# Patient Record
Sex: MALE | Race: OTHER | NOT HISPANIC OR LATINO
[De-identification: names, ages, dates, MRNs, and addresses within clinical notes are randomized per-mention and may not be internally consistent; named-entity substitution may affect disease eponyms.]

---

## 2019-05-13 PROBLEM — Z00.00 ENCOUNTER FOR PREVENTIVE HEALTH EXAMINATION: Status: ACTIVE | Noted: 2019-05-13

## 2019-06-24 ENCOUNTER — APPOINTMENT (OUTPATIENT)
Dept: HEART AND VASCULAR | Facility: CLINIC | Age: 69
End: 2019-06-24

## 2022-04-25 ENCOUNTER — APPOINTMENT (OUTPATIENT)
Dept: HEART AND VASCULAR | Facility: CLINIC | Age: 72
End: 2022-04-25
Payer: COMMERCIAL

## 2022-04-25 ENCOUNTER — APPOINTMENT (OUTPATIENT)
Dept: HEART AND VASCULAR | Facility: CLINIC | Age: 72
End: 2022-04-25

## 2022-04-25 VITALS
SYSTOLIC BLOOD PRESSURE: 160 MMHG | DIASTOLIC BLOOD PRESSURE: 90 MMHG | HEIGHT: 67 IN | HEART RATE: 72 BPM | WEIGHT: 181.01 LBS | RESPIRATION RATE: 12 BRPM | BODY MASS INDEX: 28.41 KG/M2 | TEMPERATURE: 98.4 F | OXYGEN SATURATION: 98 %

## 2022-04-25 DIAGNOSIS — I11.9 HYPERTENSIVE HEART DISEASE W/OUT HEART FAILURE: ICD-10-CM

## 2022-04-25 DIAGNOSIS — R07.89 OTHER CHEST PAIN: ICD-10-CM

## 2022-04-25 DIAGNOSIS — I34.0 NONRHEUMATIC MITRAL (VALVE) INSUFFICIENCY: ICD-10-CM

## 2022-04-25 DIAGNOSIS — R06.00 DYSPNEA, UNSPECIFIED: ICD-10-CM

## 2022-04-25 DIAGNOSIS — R94.31 ABNORMAL ELECTROCARDIOGRAM [ECG] [EKG]: ICD-10-CM

## 2022-04-25 DIAGNOSIS — Z87.891 PERSONAL HISTORY OF NICOTINE DEPENDENCE: ICD-10-CM

## 2022-04-25 DIAGNOSIS — Z82.49 FAMILY HISTORY OF ISCHEMIC HEART DISEASE AND OTHER DISEASES OF THE CIRCULATORY SYSTEM: ICD-10-CM

## 2022-04-25 PROCEDURE — 36415 COLL VENOUS BLD VENIPUNCTURE: CPT

## 2022-04-25 PROCEDURE — 93000 ELECTROCARDIOGRAM COMPLETE: CPT

## 2022-04-25 PROCEDURE — 99243 OFF/OP CNSLTJ NEW/EST LOW 30: CPT

## 2022-04-28 PROBLEM — R94.31 ABNORMAL ECG: Status: ACTIVE | Noted: 2022-04-28

## 2022-04-28 PROBLEM — Z82.49 FAMILY HISTORY OF ESSENTIAL HYPERTENSION: Status: ACTIVE | Noted: 2022-04-28

## 2022-04-28 PROBLEM — I11.9 HYPERTENSIVE HEART DISEASE: Status: ACTIVE | Noted: 2022-04-28

## 2022-04-28 PROBLEM — Z87.891 EX-SMOKER FOR MORE THAN 1 YEAR: Status: ACTIVE | Noted: 2022-04-28

## 2022-04-28 PROBLEM — I34.0 MITRAL REGURGITATION: Status: ACTIVE | Noted: 2022-04-28

## 2022-04-28 PROBLEM — R06.00 DYSPNEA: Status: ACTIVE | Noted: 2022-04-28

## 2022-04-28 PROBLEM — R07.89 CHEST DISCOMFORT: Status: ACTIVE | Noted: 2022-04-28

## 2022-04-28 NOTE — REASON FOR VISIT
[Symptom and Test Evaluation] : symptom and test evaluation [Hypertension] : hypertension [FreeTextEntry1] : 72 year old [facility] male  presents for evaluation of chest pains and exertional dyspnea

## 2022-04-28 NOTE — ASSESSMENT
[FreeTextEntry1] : Hypertension , newly  appreciated\par \par exertional dyspnea  and   chest pressure \par \par Abnormal EKG

## 2022-04-28 NOTE — HISTORY OF PRESENT ILLNESS
[FreeTextEntry1] : Ex  smoker, quit  35-40 years ago\par \par Denies diabetes, HTN, hyperlipidemia \par \par No asthma \par \par No personal hx of covid,  he  received Pfizer covid vaccine x 2  and a booster as well\par \par He has been having this exertional chest discomfort and dyspnea  for the past several weeks . Onset  of symptoms ~ 8 blocks \par \par EKG today shows   NSR 61 bpm with  atrial enlargement   without ectopy , ischemia  or pathologic Q waves  and no clear evidence of LVH \par \par Has not  done stress test in several years \par \par

## 2022-04-28 NOTE — PHYSICAL EXAM
[Well Developed] : well developed [Well Nourished] : well nourished [No Acute Distress] : no acute distress [Normal Conjunctiva] : normal conjunctiva [No Xanthelasma] : no xanthelasma [Normal Venous Pressure] : normal venous pressure [No Carotid Bruit] : no carotid bruit [Normal S1, S2] : normal S1, S2 [No Murmur] : no murmur [No Rub] : no rub [No Gallop] : no gallop [Clear Lung Fields] : clear lung fields [Good Air Entry] : good air entry [No Respiratory Distress] : no respiratory distress  [Soft] : abdomen soft [Non Tender] : non-tender [No Masses/organomegaly] : no masses/organomegaly [Normal Bowel Sounds] : normal bowel sounds [Normal Gait] : normal gait [Gait - Sufficient for Exercise Testing] : gait - sufficient for exercise testing [No Edema] : no edema [No Cyanosis] : no cyanosis [No Clubbing] : no clubbing [No Varicosities] : no varicosities [No Rash] : no rash [No Skin Lesions] : no skin lesions [Moves all extremities] : moves all extremities [No Focal Deficits] : no focal deficits [Normal Speech] : normal speech [Alert and Oriented] : alert and oriented [Normal memory] : normal memory [de-identified] : anicteric

## 2022-04-28 NOTE — DISCUSSION/SUMMARY
[Left Ventricular Hypertrophy] : left ventricular hypertrophy [Essential Hypertension] : essential hypertension [With Me] : with me [FreeTextEntry1] : venipuncture performed  with A1c, lipids, Covid Ab, etc \par \par start Losartan /HCTZ 50/12.5mg po qd \par \par reviewed echocardiogram with patient \par \par will set up  stress test\par

## 2022-05-01 LAB
ALBUMIN SERPL ELPH-MCNC: 4.6 G/DL
ALP BLD-CCNC: 51 U/L
ALT SERPL-CCNC: 9 U/L
ANION GAP SERPL CALC-SCNC: 15 MMOL/L
APPEARANCE: CLEAR
AST SERPL-CCNC: 19 U/L
BACTERIA: NEGATIVE
BASOPHILS # BLD AUTO: 0.04 K/UL
BASOPHILS NFR BLD AUTO: 0.7 %
BILIRUB SERPL-MCNC: 0.4 MG/DL
BILIRUBIN URINE: NEGATIVE
BLOOD URINE: NORMAL
BUN SERPL-MCNC: 15 MG/DL
CALCIUM SERPL-MCNC: 9.5 MG/DL
CHLORIDE SERPL-SCNC: 105 MMOL/L
CHOLEST SERPL-MCNC: 218 MG/DL
CO2 SERPL-SCNC: 23 MMOL/L
COLOR: YELLOW
CREAT SERPL-MCNC: 1.13 MG/DL
EGFR: 69 ML/MIN/1.73M2
EOSINOPHIL # BLD AUTO: 0.98 K/UL
EOSINOPHIL NFR BLD AUTO: 16.3 %
ESTIMATED AVERAGE GLUCOSE: 117 MG/DL
GLUCOSE QUALITATIVE U: NEGATIVE
GLUCOSE SERPL-MCNC: 85 MG/DL
HBA1C MFR BLD HPLC: 5.7 %
HCT VFR BLD CALC: 46.1 %
HDLC SERPL-MCNC: 73 MG/DL
HGB BLD-MCNC: 14.5 G/DL
HYALINE CASTS: 0 /LPF
IMM GRANULOCYTES NFR BLD AUTO: 0.2 %
KETONES URINE: NEGATIVE
LDLC SERPL CALC-MCNC: 130 MG/DL
LEUKOCYTE ESTERASE URINE: NEGATIVE
LYMPHOCYTES # BLD AUTO: 2.06 K/UL
LYMPHOCYTES NFR BLD AUTO: 34.3 %
MAN DIFF?: NORMAL
MCHC RBC-ENTMCNC: 27.7 PG
MCHC RBC-ENTMCNC: 31.5 GM/DL
MCV RBC AUTO: 88.1 FL
MICROSCOPIC-UA: NORMAL
MONOCYTES # BLD AUTO: 0.35 K/UL
MONOCYTES NFR BLD AUTO: 5.8 %
NEUTROPHILS # BLD AUTO: 2.56 K/UL
NEUTROPHILS NFR BLD AUTO: 42.7 %
NITRITE URINE: NEGATIVE
NONHDLC SERPL-MCNC: 146 MG/DL
NT-PROBNP SERPL-MCNC: 144 PG/ML
PH URINE: 5.5
PLATELET # BLD AUTO: 211 K/UL
POTASSIUM SERPL-SCNC: 4.1 MMOL/L
PROT SERPL-MCNC: 7.4 G/DL
PROTEIN URINE: NORMAL
RBC # BLD: 5.23 M/UL
RBC # FLD: 14.6 %
RED BLOOD CELLS URINE: 0 /HPF
SODIUM SERPL-SCNC: 143 MMOL/L
SPECIFIC GRAVITY URINE: 1.02
SQUAMOUS EPITHELIAL CELLS: 0 /HPF
TRIGL SERPL-MCNC: 76 MG/DL
TSH SERPL-ACNC: 0.88 UIU/ML
UROBILINOGEN URINE: NORMAL
WBC # FLD AUTO: 6 K/UL
WHITE BLOOD CELLS URINE: 0 /HPF

## 2022-05-06 RX ORDER — LOSARTAN POTASSIUM AND HYDROCHLOROTHIAZIDE 12.5; 5 MG/1; MG/1
50-12.5 TABLET ORAL DAILY
Qty: 90 | Refills: 2 | Status: ACTIVE | COMMUNITY
Start: 2022-04-25 | End: 1900-01-01

## 2022-10-24 ENCOUNTER — OUTPATIENT (OUTPATIENT)
Dept: OUTPATIENT SERVICES | Facility: HOSPITAL | Age: 72
LOS: 1 days | End: 2022-10-24
Payer: COMMERCIAL

## 2022-10-24 PROCEDURE — 71046 X-RAY EXAM CHEST 2 VIEWS: CPT | Mod: 26

## 2022-10-24 PROCEDURE — 71046 X-RAY EXAM CHEST 2 VIEWS: CPT

## 2022-11-01 ENCOUNTER — TRANSCRIPTION ENCOUNTER (OUTPATIENT)
Age: 72
End: 2022-11-01

## 2022-11-01 RX ORDER — CHLORHEXIDINE GLUCONATE 213 G/1000ML
1 SOLUTION TOPICAL DAILY
Refills: 0 | Status: DISCONTINUED | OUTPATIENT
Start: 2022-11-02 | End: 2022-11-02

## 2022-11-01 RX ORDER — ACETAMINOPHEN 500 MG
1000 TABLET ORAL ONCE
Refills: 0 | Status: DISCONTINUED | OUTPATIENT
Start: 2022-11-02 | End: 2022-11-02

## 2022-11-01 NOTE — ASU PATIENT PROFILE, ADULT - NS PREOP UNDERSTANDS INFO
No solid food, dairy, candy or gum after midnight, water is allowed before 09:00am tomorrow. Pt to come with photo ID/Insurance card; dress in comfortable clothes; no jewelries/valuables/contact lens; no smoking/alcohol/drug use tonight. Escort must be 18yrs old and must have a photo ID. Address and call back number was given./yes

## 2022-11-02 ENCOUNTER — OUTPATIENT (OUTPATIENT)
Dept: OUTPATIENT SERVICES | Facility: HOSPITAL | Age: 72
LOS: 1 days | Discharge: HOME | End: 2022-11-02
Payer: COMMERCIAL

## 2022-11-02 ENCOUNTER — TRANSCRIPTION ENCOUNTER (OUTPATIENT)
Age: 72
End: 2022-11-02

## 2022-11-02 VITALS
WEIGHT: 167.99 LBS | HEART RATE: 67 BPM | HEIGHT: 66 IN | OXYGEN SATURATION: 100 % | TEMPERATURE: 98 F | SYSTOLIC BLOOD PRESSURE: 169 MMHG | DIASTOLIC BLOOD PRESSURE: 85 MMHG | RESPIRATION RATE: 16 BRPM

## 2022-11-02 VITALS
DIASTOLIC BLOOD PRESSURE: 82 MMHG | HEART RATE: 82 BPM | RESPIRATION RATE: 16 BRPM | SYSTOLIC BLOOD PRESSURE: 159 MMHG | OXYGEN SATURATION: 98 %

## 2022-11-02 PROCEDURE — 49585: CPT | Mod: AS

## 2022-11-02 PROCEDURE — 49650 LAP ING HERNIA REPAIR INIT: CPT | Mod: AS,50

## 2022-11-02 DEVICE — MESH HERNIA INGUINAL PROGRIP LAPAROSCOPIC 15 X 10CM RIGHT: Type: IMPLANTABLE DEVICE | Status: FUNCTIONAL

## 2022-11-02 DEVICE — MESH HERNIA INGUINAL PROGRIP LAPAROSCOPIC 15 X 10CM LEFT: Type: IMPLANTABLE DEVICE | Status: FUNCTIONAL

## 2022-11-02 RX ORDER — SODIUM CHLORIDE 9 MG/ML
1000 INJECTION, SOLUTION INTRAVENOUS
Refills: 0 | Status: DISCONTINUED | OUTPATIENT
Start: 2022-11-02 | End: 2022-11-02

## 2022-11-02 RX ORDER — FENTANYL CITRATE 50 UG/ML
25 INJECTION INTRAVENOUS ONCE
Refills: 0 | Status: DISCONTINUED | OUTPATIENT
Start: 2022-11-02 | End: 2022-11-02

## 2022-11-02 NOTE — BRIEF OPERATIVE NOTE - OPERATION/FINDINGS
Patient was brought to the OR and placed in supine position and anesthetized.  The skin was draped and prepped in usual sterile fashion. Patient was brought to the OR and placed in supine position and anesthetized.  The skin was draped and prepped in usual sterile fashion.    No qualified resident was available to assist at bedside. A PA was necessary to complete the procedure. As dictated in full operative report.  No qualified resident was available to assist at bedside. A PA was necessary to complete the procedure.

## 2022-11-02 NOTE — PRE-ANESTHESIA EVALUATION ADULT - NSANTHOSAYNRD_GEN_A_CORE
No. STARLA screening performed.  STOP BANG Legend: 0-2 = LOW Risk; 3-4 = INTERMEDIATE Risk; 5-8 = HIGH Risk

## 2022-11-02 NOTE — BRIEF OPERATIVE NOTE - NSICDXBRIEFPREOP_GEN_ALL_CORE_FT
PRE-OP DIAGNOSIS:  Umbilical hernia 02-Nov-2022 15:02:55  Hermila Reinoso  Inguinal hernia 02-Nov-2022 15:02:39 bilateral Hermila Reinoso

## 2022-11-02 NOTE — PRE-ANESTHESIA EVALUATION ADULT - NSANTHINDVINFOSD_GEN_ALL_CORE
Your opinion matters!  [facility].  You might receive a survey in the mail or E-mail about your experience today to evaluate our clinic.  Please fill it out and return it in the pre-paid envelope or replay back via E-mail.  It was a pleasure to care for you today.     Thank you.     Dr. Billy Carr  [facility] [place] [address]    THANK YOU FOR SCHEDULING YOUR ANNUAL WELLNESS EXAM.     HERE ARE SOME WELLNESS AND SAFETY TIPS THAT I WOULD LIKE YOU TO TAKE HOME WITH YOU TODAY.    1.  Try to eat a low-fat, high-fiber diet.  Try to consume at least 5 servings of fruits or vegetables daily.  Current research would suggest that the \"Mediterranean diet\" is the most healthy diet. Some tips on healthy eating are attached below.    2.  Get regular aerobic exercise almost every day.  This means 6 out of 7 days a week.  The ultimate goal is to get  30-45 minutes of aerobic activity in.  You don't have to count your pulse.  Just work up a sweat. If you can't work out everyday, strive to get 3.5 - 4 hours of aerobic activity in weekly.     3.  Always wear your seatbelt.    4.  If you drink alcohol, always drink in moderation.  This means on average 1- 2 drinks a day for a woman, and 2-3 drinks a day for a man. (One \"drink\" = 12 ounces beer= 4 ounces wine=one ounce of hard liquor) The reason for the difference is men and woman metabolize alcohol differently.    5.  If you smoke, please try to make a serious quit attempt.  I highly recommend you contact the [place] quit line to help with this process.  It is an excellent, free service.  The phone number is 4-497-QUIT-NOW.    6.  Keep up with all the screening tests that your doctor has recommended.  Screening tests are designed to  disease in the asymptomatic state, so we want to perform these when you are feeling well.  This allows us to  the disease in a much earlier state and make a significant impact on the  outcome.    7.  If you're overweight, strive to increase exercise and decrease calorie intake such that you get your body mass index down to 25 or less.  Overweight, and obesity, fuel many of the problems we see an adult life.    8.  Reduce daily stress with 10 minutes of relaxation, silence, or meditation daily.    9.  As far as supplements go, there's not much evidence that a multiple vitamin once daily improves any outcomes.  On the other hand, it certainly doesn't appear to be harmful.  I am a proponent of vitamin D supplementation, especially in [place].  I like to see the blood level over 40.  If you're not having your blood levels monitored and adjusted by me, I would recommend 2000 international units of vitamin D 3 daily.    10.  If you do all of the above, you should be able to reduce your risk of all cardiovascular diseases, including heart disease and stroke.  In addition, we will reduce your risk of type 2 diabetes, and cancer death.     As always, please don't hesitate to call my office with any concerns or questions.    Billy Carr [place] [address]  P) [phone]  F) [phone]        How to Eat a Heart-Healthy Diet      Prepared for the subscribers of  Pharmacist’s Letter / Prescriber’s Letter to give to their patients.  Copyright © 2014 by [facility] [employer] [facility]  www.PharmacistsLetter.com ~ www.PrescribersLetter.com        Eating a heart-healthy diet can lower your risk of problems like heart attacks, strokes, and diabetes.  Use the following tips as a guide for a heart-healthy diet. Remember not to get discouraged if you  indulge in a favorite once in a while.    DO eat:  • Fruits and veggies  • Whole grains such as brown rice and oatmeal  • Low-fat dairy such as cheese, milk, or yogurt  • Poultry such as chicken and turkey  • Fish  • Beans and peas  • Vegetable oils like canola or olive  • Nuts    LIMIT  your intake of:  • Sweets like candy, ice cream, or baked goods  • Sugar-sweetened beverages such as soda,sweet tea, or coffee drinks  • Red meats such as beef and pork  • Saturated fats such as that found in processedmeats, animal fat, palm oil, coconut oil, etc.    Some heart-healthy diets include DASH, Mediterranean, American Heart Association (AHA), and  USDA Choose My Plate. Pick one you can stay with long-term.    Reducing the sodium (salt) you eat can help keep your heart healthy by lowering your bloodpressure. In fact, if you have high blood pressure, reducing your salt by about one-half teaspoon per day can drop your blood pressure by about two points.  Aim for no more than about 2400 mg of sodium, or one teaspoon of table salt, per day. A teaspoon of sea salt has a little less sodium, and a teaspoon of kosher salt has about half as much.  Surprisingly, most salt in your diet doesn’t come from the shaker. Use these tips to cut back onsodium:  • Buy fresh, plain frozen, or canned “no salt added” food. Avoid canned or processed food.  • Use herbs, spices, and salt-free seasoning in cooking and at the table.  • Cook rice, pasta, and hot cereal without salt. Cut back on instant or flavored mixes.  • Cut back on frozen dinners, pizza, canned soups or broths, and salad dressings.  • Rinse canned foods to remove some salt.  • Choose ready-to-eat breakfast cereals low in sodium.  • Taste food before reaching for the salt shaker.  • Keep in mind the amount of food that has about 1000 mg of sodium: a large fast food burger or hot dog, one large slice or two regular slices of pizza, or one can of soup.    There are “apps” for your smartphone that track what you eat. Here are some examples:  • Calorie Counter & Diet Tracker (MyFitnessPal.com) for iPhone  • MyDashDiet (http://www.[employer]/mydashdiet/) for iPhone  • Sodium Tracker (https://fotobabble.[employer]/us/yolanda/sodium-tracker/mo864619495) for iPhone  • EZ Sodium  Tracker (http://ipad-diet-apps.[employer]/page2.html) for iPhone and Android    More strategies to keep your heart healthy include:  • Exercising for around 30 minutes most days  • Stopping smoking  • Staying at a healthy weight  Making these changes can be tough. Try tackling one at a time for the best success.    How to lose weight    Losing weight is not easy.  All weight loss plans require reduction of calorie intake.  There are many ways to do this.  You can cut portions in half, you can decrease carbohydrates, you can eat more vegetables, and less fats.  The bottom line is, If you can eliminate 500 calories from your average daily intake, you will lose a pound a week.  This is a known metabolic formula.  You do not need to change anything else in your life to lose this weight.  We would like you to be physically active and exercise aerobically for 3-1/2-4 hours weekly.  However, exercise will not help you lose enough weight.      To do this you must first determine the number of calories you take it on an average day.  I suggest a three-day diet history to get a handle on how many calories you are taking in.  Get a calorie counter from the [employer], or an yolanda for your smartphone (Calorie Counter & Diet Tracker (MyFitnessPal.com) for [employer]one is a free yolanda that will help to do this), or just Google \"calorie counter\" in your search engine.  For 3 days you will record everything that goes past your lips.    Add up all the calories for 3 days, and then divide by 3 to get your average daily intake.  In the process of doing this exercise you will learn a lot about where the \"easy\" calories are to eliminate.  You must eliminate 500 calories daily from your average intake.  You can use the yolanda to help you count up calories during the day to make sure you don't exceed your limit.    This will take a lot of discipline on your part, but is always effective.  Do not think of this is a \"diet\" which implies that this will be  temporary.  Think of this as a way of changing your eating style for the rest of your life.     patient

## 2022-11-02 NOTE — ASU DISCHARGE PLAN (ADULT/PEDIATRIC) - ASU DC SPECIAL INSTRUCTIONSFT
Bed rest for 4 days  Ice packs ALL the time  At least 2L of water in 24 hours  2 extra strength Tylenol + 1 Advil every 6 hours standing (3 pills total at the same time)  Liquid diet until bowel movement  Oxycodone if needed - please  from pharmacy  Please call the doctor's office for a follow up appointment

## 2022-11-02 NOTE — BRIEF OPERATIVE NOTE - NSICDXBRIEFPROCEDURE_GEN_ALL_CORE_FT
PROCEDURES:  Repair, hernia, umbilical, adult 02-Nov-2022 15:03:25  Hermila Reinoso  Repair, hernia, inguinal, robot-assisted 02-Nov-2022 15:04:29  Hermila Reinoso

## 2022-11-02 NOTE — ASU DISCHARGE PLAN (ADULT/PEDIATRIC) - CARE PROVIDER_API CALL
Brittany Palm (MD)  Surgery  [address], [address]  [place], [place] [address]  Phone: [phone]  Fax: [phone]  Follow Up Time:

## 2022-11-02 NOTE — BRIEF OPERATIVE NOTE - ASSISTANT(S)
Dr. Kierra Thomas, PGY2  Arleen Sharpe, MS3 Hermila Reinoso PA-C  Dr. Kierra Thomas, PGY2  Arleen Sharpe, MS3 SAFIA Madsen, PGY3  Arleen Sharpe, MS3

## 2022-11-02 NOTE — ASU DISCHARGE PLAN (ADULT/PEDIATRIC) - NS MD DC FALL RISK RISK
For information on Fall & Injury Prevention, visit: [email or web address] OR  [email or web address] OR  https://www.cdc.gov/steadi/patient.html

## 2022-11-02 NOTE — BRIEF OPERATIVE NOTE - NSICDXBRIEFPOSTOP_GEN_ALL_CORE_FT
POST-OP DIAGNOSIS:  Inguinal hernia 02-Nov-2022 15:03:01 bilateral Hermila Reinoso  Umbilical hernia 02-Nov-2022 15:02:59  Hermila Reinoso

## 2023-10-31 NOTE — ASU PATIENT PROFILE, ADULT - NS PRO ABUSE SCREEN AFRAID ANYONE YN
Telephone call received from patient.      Patient complains of UTI signs and symptoms including +urgency, +frequency, +dysuria x 2 weeks     Denies fever    Allergies and previous cultures reviewed    Discussed with AMADOU Nix Bactrim DS 1 tab po bid x 7 days  Urine culture ordered, will follow    Empiric antibiotics sent to patient's preferred pharmacy osito on file      Encouraged PO hydration, AZO prn for pain     Discussed avoidance of bladder irritants such as alcohol, caffeine, carbonation    All questions answered    Encouraged to call if symptoms worsen or fail to improve     Office number provided /[phone]    Patient understands and agrees to plan
no

## (undated) DEVICE — XI 12MM AND STAPLER CANNULA SEAL

## (undated) DEVICE — XI OBTURATOR OPTICAL BLADELESS 8MM

## (undated) DEVICE — TIP METZENBAUM SCISSOR MONOPOLAR ENDOCUT (ORANGE)

## (undated) DEVICE — SUT MAXON 0 30" HGU-46

## (undated) DEVICE — TUBING STRYKER PNEUMOCLEAR HIGH FLOW

## (undated) DEVICE — D HELP - CLEARVIEW CLEARIFY SYSTEM

## (undated) DEVICE — POSITIONER FOAM EGG CRATE ULNAR 2PCS (PINK)

## (undated) DEVICE — DRSG DERMABOND 0.7ML

## (undated) DEVICE — NDL LABORIE INJETAK ADJUSTABLE TIP 35CM

## (undated) DEVICE — SUT PDS II 0 18" ENDOLOOP LIGATURE

## (undated) DEVICE — GLV 7.5 SENSICARE W ALOE

## (undated) DEVICE — XI DRAPE COLUMN

## (undated) DEVICE — DRSG STERISTRIPS 0.25 X 4"

## (undated) DEVICE — SCOPE WARMER SEAL DISP

## (undated) DEVICE — DRAPE MAYO STAND 30"

## (undated) DEVICE — SUT MONOCRYL 4-0 27" PS-2 UNDYED

## (undated) DEVICE — SUT PROLENE 0 30" CT-2

## (undated) DEVICE — TROCAR APPLIED MEDICAL KII BALLOON BLUNT TIP 12MM X 100MM

## (undated) DEVICE — SUT MONOCRYL 4-0 18" PS-2

## (undated) DEVICE — ELCTR BOVIE TIP BLADE INSULATED 2.75" EDGE

## (undated) DEVICE — SUT PDO 0 1/2 CIRCLE 22MM NDL 20CM

## (undated) DEVICE — SUT VICRYL 2-0 27" SH UNDYED

## (undated) DEVICE — WARMING BLANKET UPPER ADULT

## (undated) DEVICE — XI DRAPE ARM

## (undated) DEVICE — SUT PDO 0 1/2 CIRCLE 26MM NDL 20CM

## (undated) DEVICE — GOWN ROYAL SILK XL

## (undated) DEVICE — SUT VICRYL 2-0 27" SH

## (undated) DEVICE — PACK GENERAL LAPAROSCOPY

## (undated) DEVICE — Device

## (undated) DEVICE — PACK GENERAL MINOR

## (undated) DEVICE — SLV COMPRESSION KNEE MED

## (undated) DEVICE — TROCAR COVIDIEN VERSAPORT BLADELESS OPTICAL 5MM STANDARD

## (undated) DEVICE — XI SEAL UNIV 5- 8 MM

## (undated) DEVICE — TROCAR COVIDIEN VERSAONE FIXATION CANNULA 12MM

## (undated) DEVICE — DRSG TEGADERM 6"X8"

## (undated) DEVICE — XI TIP COVER

## (undated) DEVICE — SUT VICRYL 0 27" UR-6

## (undated) DEVICE — SUT VICRYL 2-0 18" TIES

## (undated) DEVICE — DRSG SUPPORTER ADULT 3" WAISTBAND LRG